# Patient Record
Sex: FEMALE | Race: ASIAN | NOT HISPANIC OR LATINO | URBAN - METROPOLITAN AREA
[De-identification: names, ages, dates, MRNs, and addresses within clinical notes are randomized per-mention and may not be internally consistent; named-entity substitution may affect disease eponyms.]

---

## 2020-07-21 ENCOUNTER — LAB OUTSIDE AN ENCOUNTER (OUTPATIENT)
Dept: URBAN - METROPOLITAN AREA CLINIC 13 | Facility: CLINIC | Age: 35
End: 2020-07-21

## 2020-07-21 LAB
LAB AP ANCILLARY STUDIES(RICH): (no result)
LAB AP GROSS DESCRIPTION (RICH): (no result)

## 2020-07-25 ENCOUNTER — TELEPHONE ENCOUNTER (OUTPATIENT)
Dept: URBAN - METROPOLITAN AREA CLINIC 13 | Facility: CLINIC | Age: 35
End: 2020-07-25

## 2020-07-25 RX ORDER — FAMOTIDINE 20 MG/1
TAKE 1 TABLET AT BEDTIME TABLET, FILM COATED ORAL
Refills: 0 | OUTPATIENT
End: 2019-02-20

## 2020-07-25 RX ORDER — DROSPIRENONE AND ETHINYL ESTRADIOL 0.03MG-3MG
TAKE 1 TABLET DAILY KIT ORAL
Refills: 0 | OUTPATIENT
Start: 2005-10-06 | End: 2010-05-19

## 2020-07-25 RX ORDER — IPRATROPIUM BROMIDE 21 UG/1
1 SPRAY TWICE DAILY SPRAY, METERED NASAL
Refills: 0 | OUTPATIENT
End: 2019-02-20

## 2020-07-25 RX ORDER — TRAMADOL HYDROCHLORIDE 50 MG/1
TAKE 1 TABLET BY MOUTH EVERY 6 HOURS AS NEEDED FOR PAIN TABLET ORAL
Qty: 60 | Refills: 0 | OUTPATIENT
End: 2019-02-20

## 2020-07-25 RX ORDER — MONTELUKAST SODIUM 10 MG/1
TAKE 1 TABLET DAILY TABLET, FILM COATED ORAL
Refills: 0 | OUTPATIENT
End: 2019-02-20

## 2020-07-25 RX ORDER — MELOXICAM 15 MG/1
TAKE 1 TABLET DAILY AS NEEDED TABLET ORAL
Refills: 0 | OUTPATIENT
End: 2017-01-12

## 2020-07-25 RX ORDER — TRAMADOL HYDROCHLORIDE 50 MG/1
TAKE 1 TABLET BY MOUTH EVERY 6 HOURS AS NEEDED FOR PAIN TABLET ORAL
Qty: 50 | Refills: 1 | OUTPATIENT
End: 2017-01-12

## 2020-07-25 RX ORDER — FLUCONAZOLE 100 MG/1
TAKE AS DIRECTED TABLET ORAL
Qty: 12 | Refills: 0 | OUTPATIENT
End: 2019-02-20

## 2020-07-25 RX ORDER — SUCRALFATE 1 G/10ML
TAKE 10 ML BY MOUTH FOUR TIMES DAILY BEFORE EACH MEAL AND AT BEDTIME SUSPENSION ORAL
Qty: 1200 | Refills: 3 | OUTPATIENT
Start: 2016-02-16 | End: 2019-02-20

## 2020-07-25 RX ORDER — METRONIDAZOLE 375 MG/1
TAKE 1 CAPSULE 4 TIMES DAILY CAPSULE ORAL
Refills: 0 | OUTPATIENT
Start: 2006-05-19 | End: 2007-01-04

## 2020-07-25 RX ORDER — NITROFURANTOIN MONOHYDRATE/MACROCRYSTALLINE 25; 75 MG/1; MG/1
TAKE 1 CAPSULE TWICE DAILY UNTIL GONE CAPSULE ORAL
Refills: 0 | OUTPATIENT
End: 2019-02-20

## 2020-07-26 ENCOUNTER — TELEPHONE ENCOUNTER (OUTPATIENT)
Dept: URBAN - METROPOLITAN AREA CLINIC 13 | Facility: CLINIC | Age: 35
End: 2020-07-26

## 2020-07-26 RX ORDER — CYCLOBENZAPRINE HYDROCHLORIDE 5 MG/1
TABLET, FILM COATED ORAL
Qty: 30 | Refills: 0 | Status: ACTIVE | COMMUNITY
Start: 2018-04-25

## 2020-07-26 RX ORDER — MAGNESIUM OXIDE 400 MG
TAKE 1 CAPSULE DAILY CAPSULE ORAL
Refills: 0 | Status: ACTIVE | COMMUNITY

## 2020-07-26 RX ORDER — EPINEPHRINE 0.3 MG/.3ML
INJECT INTRAMUSCULARLY AS DIRECTED AT FIRST SIGN OF ANAPHYLAXIS INJECTION INTRAMUSCULAR
Qty: 2 | Refills: 0 | Status: ACTIVE | COMMUNITY
Start: 2018-10-17

## 2020-07-26 RX ORDER — ALPRAZOLAM 0.5 MG
TAKE 1 TABLET 3 TIMES DAILY AS NEEDED TABLET ORAL
Refills: 0 | Status: ACTIVE | COMMUNITY

## 2020-07-26 RX ORDER — LANADELUMAB-FLYO 300 MG/2ML
INJECTION, SOLUTION SUBCUTANEOUS
Refills: 0 | Status: ACTIVE | COMMUNITY

## 2020-07-26 RX ORDER — AZITHROMYCIN DIHYDRATE 250 MG/1
TABLET, FILM COATED ORAL
Qty: 6 | Refills: 0 | Status: ACTIVE | COMMUNITY
Start: 2018-08-14

## 2020-07-26 RX ORDER — HUMAN C1-ESTERASE INHIBITOR 500 [IU]/5ML
INJECTION, POWDER, LYOPHILIZED, FOR SOLUTION INTRAVENOUS
Qty: 14 | Refills: 0 | Status: ACTIVE | COMMUNITY
Start: 2018-02-01

## 2020-07-26 RX ORDER — OLOPATADINE HYDROCHLORIDE 7 MG/ML
SOLUTION OPHTHALMIC
Qty: 2 | Refills: 0 | Status: ACTIVE | COMMUNITY
Start: 2018-07-09

## 2020-07-26 RX ORDER — ZOLPIDEM TARTRATE 10 MG/1
TAKE 1 TABLET AT BEDTIME AS NEEDED FOR SLEEP TABLET, FILM COATED ORAL
Qty: 15 | Refills: 0 | Status: ACTIVE | COMMUNITY

## 2020-07-26 RX ORDER — DOXYCYCLINE 100 MG/1
CAPSULE ORAL
Qty: 30 | Refills: 0 | Status: ACTIVE | COMMUNITY
Start: 2018-07-31

## 2020-07-26 RX ORDER — FLUTICASONE PROPIONATE 50 UG/1
USE 1 SPRAY IN EACH NOSTRIL TWICE DAILY SPRAY, METERED NASAL
Refills: 0 | Status: ACTIVE | COMMUNITY

## 2020-07-26 RX ORDER — MELOXICAM 15 MG/1
TAKE 1 TABLET DAILY AS NEEDED TABLET ORAL
Refills: 0 | Status: ACTIVE | COMMUNITY

## 2020-07-26 RX ORDER — ICATIBANT ACETATE 30 MG/3ML
INJECT 1 GM  PRN INJECTION, SOLUTION SUBCUTANEOUS
Refills: 0 | Status: ACTIVE | COMMUNITY

## 2020-07-26 RX ORDER — MONTELUKAST SODIUM 10 MG/1
TAKE 1 TABLET DAILY AS DIRECTED TABLET, FILM COATED ORAL
Refills: 0 | Status: ACTIVE | COMMUNITY

## 2020-07-26 RX ORDER — FAMOTIDINE 20 MG/1
TAKE ONE TABLET BY MOUTH AT BEDTIME TABLET, FILM COATED ORAL
Qty: 90 | Refills: 3 | Status: ACTIVE | COMMUNITY
Start: 2020-04-29

## 2020-07-26 RX ORDER — OMEPRAZOLE 40 MG/1
1 CAPSULE BY MOUTH 1 HOUR BEFORE BREAKFAST DAILY CAPSULE, DELAYED RELEASE ORAL
Qty: 90 | Refills: 3 | Status: ACTIVE | COMMUNITY
Start: 2020-04-30

## 2020-07-26 RX ORDER — ESCITALOPRAM 10 MG/1
TABLET, FILM COATED ORAL
Qty: 90 | Refills: 0 | Status: ACTIVE | COMMUNITY
Start: 2018-04-25

## 2020-07-26 RX ORDER — ALBUTEROL SULFATE 90 UG/1
POWDER, METERED RESPIRATORY (INHALATION)
Qty: 1 | Refills: 0 | Status: ACTIVE | COMMUNITY
Start: 2018-07-09

## 2020-07-26 RX ORDER — HYOSCYAMINE SULFATE 0.12 MG/1
PLACE 1 TABLET EVERY 6 HOURS PRN ABD PAIN TABLET, ORALLY DISINTEGRATING ORAL
Qty: 60 | Refills: 1 | Status: ACTIVE | COMMUNITY
Start: 2019-02-20

## 2020-07-26 RX ORDER — PREDNISONE 10 MG/1
TABLET ORAL
Qty: 35 | Refills: 0 | Status: ACTIVE | COMMUNITY
Start: 2018-07-12

## 2020-08-27 ENCOUNTER — WEB ENCOUNTER (OUTPATIENT)
Dept: URBAN - METROPOLITAN AREA CLINIC 113 | Facility: CLINIC | Age: 35
End: 2020-08-27

## 2020-08-27 ENCOUNTER — OFFICE VISIT (OUTPATIENT)
Dept: URBAN - METROPOLITAN AREA CLINIC 113 | Facility: CLINIC | Age: 35
End: 2020-08-27
Payer: COMMERCIAL

## 2020-08-27 VITALS
TEMPERATURE: 97.5 F | RESPIRATION RATE: 20 BRPM | BODY MASS INDEX: 27.97 KG/M2 | HEIGHT: 62 IN | WEIGHT: 152 LBS | HEART RATE: 75 BPM | SYSTOLIC BLOOD PRESSURE: 116 MMHG | DIASTOLIC BLOOD PRESSURE: 78 MMHG

## 2020-08-27 DIAGNOSIS — K20.8 LOS ANGELES GRADE C ESOPHAGITIS: ICD-10-CM

## 2020-08-27 DIAGNOSIS — K21.9 GASTROESOPHAGEAL REFLUX DISEASE, ESOPHAGITIS PRESENCE NOT SPECIFIED: ICD-10-CM

## 2020-08-27 DIAGNOSIS — D13.4 HEPATIC ADENOMA: ICD-10-CM

## 2020-08-27 DIAGNOSIS — K58.2 IRRITABLE BOWEL SYNDROME WITH BOTH CONSTIPATION AND DIARRHEA: ICD-10-CM

## 2020-08-27 PROCEDURE — G8427 DOCREV CUR MEDS BY ELIG CLIN: HCPCS | Performed by: INTERNAL MEDICINE

## 2020-08-27 PROCEDURE — 99213 OFFICE O/P EST LOW 20 MIN: CPT | Performed by: INTERNAL MEDICINE

## 2020-08-27 PROCEDURE — 1036F TOBACCO NON-USER: CPT | Performed by: INTERNAL MEDICINE

## 2020-08-27 PROCEDURE — G9903 PT SCRN TBCO ID AS NON USER: HCPCS | Performed by: INTERNAL MEDICINE

## 2020-08-27 RX ORDER — EPINEPHRINE 0.3 MG/.3ML
INJECT INTRAMUSCULARLY AS DIRECTED AT FIRST SIGN OF ANAPHYLAXIS INJECTION INTRAMUSCULAR
Qty: 2 | Refills: 0 | Status: ACTIVE | COMMUNITY
Start: 2018-10-17

## 2020-08-27 RX ORDER — MONTELUKAST SODIUM 10 MG/1
TAKE 1 TABLET DAILY AS DIRECTED TABLET, FILM COATED ORAL
Refills: 0 | Status: ACTIVE | COMMUNITY

## 2020-08-27 RX ORDER — FAMOTIDINE 20 MG/1
TAKE ONE TABLET BY MOUTH AT BEDTIME TABLET, FILM COATED ORAL
Qty: 90 | Refills: 3 | Status: ACTIVE | COMMUNITY
Start: 2020-04-29

## 2020-08-27 RX ORDER — HUMAN C1-ESTERASE INHIBITOR 500 [IU]/5ML
AS DIRECTED INJECTION, POWDER, LYOPHILIZED, FOR SOLUTION INTRAVENOUS
Refills: 0 | Status: ACTIVE | COMMUNITY
Start: 2018-02-01

## 2020-08-27 RX ORDER — ALBUTEROL SULFATE 90 UG/1
POWDER, METERED RESPIRATORY (INHALATION)
Qty: 1 | Refills: 0 | Status: ACTIVE | COMMUNITY
Start: 2018-07-09

## 2020-08-27 RX ORDER — FLUTICASONE PROPIONATE 50 UG/1
USE 1 SPRAY IN EACH NOSTRIL TWICE DAILY SPRAY, METERED NASAL
Refills: 0 | Status: ACTIVE | COMMUNITY

## 2020-08-27 RX ORDER — OMEPRAZOLE 40 MG/1
1 CAPSULE 30 MINUTES BEFORE MORNING MEAL CAPSULE, DELAYED RELEASE ORAL TWICE A DAY
Refills: 3 | Status: ACTIVE | COMMUNITY
Start: 2020-04-30

## 2020-08-27 RX ORDER — ICATIBANT ACETATE 30 MG/3ML
INJECT 1 GM  PRN INJECTION, SOLUTION SUBCUTANEOUS
Refills: 0 | Status: ACTIVE | COMMUNITY

## 2020-08-27 RX ORDER — ESCITALOPRAM 10 MG/1
TABLET, FILM COATED ORAL
Qty: 90 | Refills: 0 | Status: ACTIVE | COMMUNITY
Start: 2018-04-25

## 2020-08-27 RX ORDER — OMEPRAZOLE 40 MG/1
1 CAPSULE CAPSULE, DELAYED RELEASE ORAL TWICE A DAY
Qty: 60 | Refills: 5 | OUTPATIENT
Start: 2020-08-27

## 2020-08-27 RX ORDER — OLOPATADINE HYDROCHLORIDE 7 MG/ML
1 DROP INTO AFFECTED EYE SOLUTION OPHTHALMIC ONCE A DAY
Refills: 0 | Status: ACTIVE | COMMUNITY
Start: 2018-07-09

## 2020-08-27 RX ORDER — ZOLPIDEM TARTRATE 10 MG/1
TAKE 1 TABLET AT BEDTIME AS NEEDED FOR SLEEP TABLET, FILM COATED ORAL
Qty: 15 | Refills: 0 | Status: ACTIVE | COMMUNITY

## 2020-08-27 RX ORDER — ALPRAZOLAM 0.5 MG
TAKE 1 TABLET 3 TIMES DAILY AS NEEDED TABLET ORAL
Refills: 0 | Status: ACTIVE | COMMUNITY

## 2020-08-27 NOTE — HPI-TODAY'S VISIT:
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 She is under the care of Dr. Milligan for hereditary angioedema. She was seen in hospital consultation 7/20/2020 for epigastric pain, nausea and vomiting, and diarrhea.  It was considered that nausea, vomiting and diarrhea were likely secondary to gastrointestinal manifestations of hereditary angioedema.  However, symptoms continued despite taking C1 esterase inhibitor.  CT was negative for gastrointestinal angioedema of the bowel.  Stool for Campylobacter and Giardia were negative.  Other stool studies were pending at the time of the consultation.  There was concern that epigastric pain may be related to gastritis versus peptic ulcer disease.  She had been taking ibuprofen 800 mg every other day.   EGD 7/21/2020: Erosive esophagitis, LA grade C, otherwise normal to D3.  Distal esophageal biopsies demonstrated active esophagitis.  No fungal organisms or viral cytopathic effect was seen.  No additional hospital records are available. She has been compliant with pantoprazole 40 mg twice a day since discharge. She is no longer on NSAIDs. She exhausted her supply after 30 days and is now taking omeprazole 40 mg twice a day.  She reports resolution of symptoms.  Bowel habits have returned to baseline reporting occasional constipation and occasional diarrhea.

## 2020-08-29 PROBLEM — 16761005: Status: ACTIVE | Noted: 2020-08-27

## 2020-08-29 PROBLEM — 235595009: Status: ACTIVE | Noted: 2020-08-29

## 2020-08-29 PROBLEM — 424263008: Status: ACTIVE | Noted: 2020-08-27

## 2020-08-29 PROBLEM — 10743008: Status: ACTIVE | Noted: 2020-08-29

## 2020-10-15 ENCOUNTER — OFFICE VISIT (OUTPATIENT)
Dept: URBAN - METROPOLITAN AREA CLINIC 113 | Facility: CLINIC | Age: 35
End: 2020-10-15

## 2021-02-01 ENCOUNTER — TELEPHONE ENCOUNTER (OUTPATIENT)
Dept: URBAN - METROPOLITAN AREA CLINIC 113 | Facility: CLINIC | Age: 36
End: 2021-02-01

## 2021-02-01 RX ORDER — OMEPRAZOLE 40 MG/1
1 CAPSULE CAPSULE, DELAYED RELEASE ORAL TWICE A DAY
Qty: 60 | Refills: 5
Start: 2020-08-27

## 2021-10-21 ENCOUNTER — OFFICE VISIT (OUTPATIENT)
Dept: URBAN - METROPOLITAN AREA CLINIC 113 | Facility: CLINIC | Age: 36
End: 2021-10-21
Payer: COMMERCIAL

## 2021-10-21 ENCOUNTER — LAB OUTSIDE AN ENCOUNTER (OUTPATIENT)
Dept: URBAN - METROPOLITAN AREA CLINIC 113 | Facility: CLINIC | Age: 36
End: 2021-10-21

## 2021-10-21 VITALS
BODY MASS INDEX: 31.1 KG/M2 | WEIGHT: 169 LBS | HEART RATE: 84 BPM | SYSTOLIC BLOOD PRESSURE: 135 MMHG | DIASTOLIC BLOOD PRESSURE: 76 MMHG | HEIGHT: 62 IN | TEMPERATURE: 98 F | RESPIRATION RATE: 18 BRPM

## 2021-10-21 DIAGNOSIS — R13.19 ESOPHAGEAL DYSPHAGIA: ICD-10-CM

## 2021-10-21 DIAGNOSIS — D13.4 ADENOMA OF LIVER: ICD-10-CM

## 2021-10-21 DIAGNOSIS — K62.5 BLOOD PER RECTUM: ICD-10-CM

## 2021-10-21 DIAGNOSIS — K21.00 GASTROESOPHAGEAL REFLUX DISEASE WITH ESOPHAGITIS WITHOUT HEMORRHAGE: ICD-10-CM

## 2021-10-21 PROCEDURE — 99214 OFFICE O/P EST MOD 30 MIN: CPT | Performed by: NURSE PRACTITIONER

## 2021-10-21 RX ORDER — OMEPRAZOLE 40 MG/1
1 CAPSULE 30 MINUTES BEFORE MORNING MEAL CAPSULE, DELAYED RELEASE ORAL TWICE A DAY
Refills: 3 | Status: ACTIVE | COMMUNITY
Start: 2020-04-30

## 2021-10-21 RX ORDER — HUMAN C1-ESTERASE INHIBITOR 500 [IU]/5ML
AS DIRECTED INJECTION, POWDER, LYOPHILIZED, FOR SOLUTION INTRAVENOUS
Refills: 0 | Status: ACTIVE | COMMUNITY
Start: 2018-02-01

## 2021-10-21 RX ORDER — OLOPATADINE HYDROCHLORIDE 7 MG/ML
1 DROP INTO AFFECTED EYE SOLUTION OPHTHALMIC ONCE A DAY
Refills: 0 | Status: ACTIVE | COMMUNITY
Start: 2018-07-09

## 2021-10-21 RX ORDER — ZOLPIDEM TARTRATE 10 MG/1
TAKE 1 TABLET AT BEDTIME AS NEEDED FOR SLEEP TABLET, FILM COATED ORAL
Qty: 15 | Refills: 0 | Status: ACTIVE | COMMUNITY

## 2021-10-21 RX ORDER — OMEPRAZOLE 40 MG/1
1 CAPSULE CAPSULE, DELAYED RELEASE ORAL TWICE DAILY
Qty: 180 | Refills: 3 | OUTPATIENT
Start: 2020-04-30

## 2021-10-21 RX ORDER — ALPRAZOLAM 0.5 MG
TAKE 1 TABLET 3 TIMES DAILY AS NEEDED TABLET ORAL
Refills: 0 | Status: ACTIVE | COMMUNITY

## 2021-10-21 RX ORDER — SUCRALFATE 1 G
1 TABLET ON AN EMPTY STOMACH TABLET ORAL TWICE A DAY
Status: ACTIVE | COMMUNITY

## 2021-10-21 RX ORDER — ALBUTEROL SULFATE 90 UG/1
POWDER, METERED RESPIRATORY (INHALATION)
Qty: 1 | Refills: 0 | Status: ACTIVE | COMMUNITY
Start: 2018-07-09

## 2021-10-21 RX ORDER — FAMOTIDINE 20 MG/1
TAKE ONE TABLET BY MOUTH AT BEDTIME TABLET, FILM COATED ORAL
Qty: 90 | Refills: 3 | Status: ACTIVE | COMMUNITY
Start: 2020-04-29

## 2021-10-21 RX ORDER — ICATIBANT ACETATE 30 MG/3ML
INJECT 1 GM  PRN INJECTION, SOLUTION SUBCUTANEOUS
Refills: 0 | Status: ACTIVE | COMMUNITY

## 2021-10-21 RX ORDER — FLUTICASONE PROPIONATE 50 UG/1
USE 1 SPRAY IN EACH NOSTRIL TWICE DAILY SPRAY, METERED NASAL
Refills: 0 | Status: ACTIVE | COMMUNITY

## 2021-10-21 RX ORDER — FAMOTIDINE 20 MG/1
TAKE ONE TABLET BY MOUTH AT BEDTIME TABLET, FILM COATED ORAL
OUTPATIENT
Start: 2020-04-29

## 2021-10-21 RX ORDER — EPINEPHRINE 0.3 MG/.3ML
INJECT INTRAMUSCULARLY AS DIRECTED AT FIRST SIGN OF ANAPHYLAXIS INJECTION INTRAMUSCULAR
Qty: 2 | Refills: 0 | Status: ACTIVE | COMMUNITY
Start: 2018-10-17

## 2021-10-21 RX ORDER — ESCITALOPRAM 10 MG/1
TABLET, FILM COATED ORAL
Qty: 90 | Refills: 0 | Status: ACTIVE | COMMUNITY
Start: 2018-04-25

## 2021-10-21 RX ORDER — OMEPRAZOLE 40 MG/1
1 CAPSULE CAPSULE, DELAYED RELEASE ORAL TWICE A DAY
Qty: 60 | Refills: 5 | Status: ACTIVE | COMMUNITY
Start: 2020-08-27

## 2021-10-21 RX ORDER — SIMETHICONE 80 MG/1
1 TABLET AFTER MEALS AND AT BEDTIME AS NEEDED TABLET, CHEWABLE ORAL
Status: ACTIVE | COMMUNITY

## 2021-10-21 RX ORDER — MONTELUKAST SODIUM 10 MG/1
TAKE 1 TABLET DAILY AS DIRECTED TABLET, FILM COATED ORAL
Refills: 0 | Status: ACTIVE | COMMUNITY

## 2021-10-21 RX ORDER — SUCRALFATE 1 G
1 TABLET ON AN EMPTY STOMACH TABLET ORAL TWICE A DAY
OUTPATIENT

## 2021-10-21 NOTE — HPI-TODAY'S VISIT:
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  She was last seen in the office in August 2020 in follow-up after a hospitalization for epigastric pain, nausea and vomiting and diarrhea.  It was considered that nausea, vomiting and diarrhea are likely secondary to gastrointestinal manifestations of hereditary angioedema.  CT was negative for gastrointestinal angioedema of the bowel.  Stool for Campylobacter and Giardia were negative.  There was concern for possible gastritis versus peptic ulcer disease given her use of NSAIDs daily, so she was planned for an EGD.  An EGD in July 2020 showed erosive esophagitis, LA grade C esophagitis, otherwise normal EGD to D3.  Distal esophageal biopsies demonstrated active esophagitis.  She was to continue twice daily PPI, and was asymptomatic on that regimen. MRI 10/8/2020: Stable involuting adenomas with previous larger dominant lesion in segment VIII is no longer visualized. Otehr stable 0.9 cm adenoma in hepatic segment VIII.  There is dysphagia. She describes a sensation of something stuck in her throat all of the time, requiring she clear her throat often. She is taking allergy medications, flonase, etc. She has trouble with solid foods sticking at the sternal notch. Water can be problematic at times. She complains of heartburn. She tells me that she started a new medication for hereditary angioedema in July with significant burning. She took this for 3 months, but could not tolerate it due to complaints of burning from her umbilicus to the throat.  She has had improvement in her burning chest pain since stopping Orladyo. She is back on Cinryze for angioedema. She is taking omeprazole 40 mg once daily. She has been taking famotidine at nighttime. The ED added carafate as well. There is nausea frequently. No vomiting. She has bowel movements daily. She has noted some red blood per rectum, mostly on the tissue and small in volume. She is not on a fiber supplement.

## 2021-10-28 ENCOUNTER — OFFICE VISIT (OUTPATIENT)
Dept: URBAN - METROPOLITAN AREA SURGERY CENTER 25 | Facility: SURGERY CENTER | Age: 36
End: 2021-10-28
Payer: COMMERCIAL

## 2021-10-28 DIAGNOSIS — K20.90 ESOPHAGITIS: ICD-10-CM

## 2021-10-28 DIAGNOSIS — K29.50 CHRONIC GASTRITIS: ICD-10-CM

## 2021-10-28 PROCEDURE — 43239 EGD BIOPSY SINGLE/MULTIPLE: CPT | Performed by: INTERNAL MEDICINE

## 2021-10-28 PROCEDURE — G8907 PT DOC NO EVENTS ON DISCHARG: HCPCS | Performed by: INTERNAL MEDICINE

## 2021-10-28 RX ORDER — ZOLPIDEM TARTRATE 10 MG/1
TAKE 1 TABLET AT BEDTIME AS NEEDED FOR SLEEP TABLET, FILM COATED ORAL
Qty: 15 | Refills: 0 | Status: ACTIVE | COMMUNITY

## 2021-10-28 RX ORDER — SUCRALFATE 1 G
1 TABLET ON AN EMPTY STOMACH TABLET ORAL TWICE A DAY
Status: ACTIVE | COMMUNITY

## 2021-10-28 RX ORDER — ALPRAZOLAM 0.5 MG
TAKE 1 TABLET 3 TIMES DAILY AS NEEDED TABLET ORAL
Refills: 0 | Status: ACTIVE | COMMUNITY

## 2021-10-28 RX ORDER — ICATIBANT ACETATE 30 MG/3ML
INJECT 1 GM  PRN INJECTION, SOLUTION SUBCUTANEOUS
Refills: 0 | Status: ACTIVE | COMMUNITY

## 2021-10-28 RX ORDER — SIMETHICONE 80 MG/1
1 TABLET AFTER MEALS AND AT BEDTIME AS NEEDED TABLET, CHEWABLE ORAL
Status: ACTIVE | COMMUNITY

## 2021-10-28 RX ORDER — OMEPRAZOLE 40 MG/1
1 CAPSULE CAPSULE, DELAYED RELEASE ORAL TWICE DAILY
Qty: 180 | Refills: 3 | Status: ACTIVE | COMMUNITY
Start: 2020-04-30

## 2021-10-28 RX ORDER — MONTELUKAST SODIUM 10 MG/1
TAKE 1 TABLET DAILY AS DIRECTED TABLET, FILM COATED ORAL
Refills: 0 | Status: ACTIVE | COMMUNITY

## 2021-10-28 RX ORDER — EPINEPHRINE 0.3 MG/.3ML
INJECT INTRAMUSCULARLY AS DIRECTED AT FIRST SIGN OF ANAPHYLAXIS INJECTION INTRAMUSCULAR
Qty: 2 | Refills: 0 | Status: ACTIVE | COMMUNITY
Start: 2018-10-17

## 2021-10-28 RX ORDER — FAMOTIDINE 20 MG/1
TAKE ONE TABLET BY MOUTH AT BEDTIME TABLET, FILM COATED ORAL
Status: ACTIVE | COMMUNITY
Start: 2020-04-29

## 2021-10-28 RX ORDER — FLUTICASONE PROPIONATE 50 UG/1
USE 1 SPRAY IN EACH NOSTRIL TWICE DAILY SPRAY, METERED NASAL
Refills: 0 | Status: ACTIVE | COMMUNITY

## 2021-10-28 RX ORDER — ESCITALOPRAM 10 MG/1
TABLET, FILM COATED ORAL
Qty: 90 | Refills: 0 | Status: ACTIVE | COMMUNITY
Start: 2018-04-25

## 2021-10-28 RX ORDER — OMEPRAZOLE 40 MG/1
1 CAPSULE CAPSULE, DELAYED RELEASE ORAL TWICE A DAY
Qty: 60 | Refills: 5 | Status: ACTIVE | COMMUNITY
Start: 2020-08-27

## 2021-10-28 RX ORDER — HUMAN C1-ESTERASE INHIBITOR 500 [IU]/5ML
AS DIRECTED INJECTION, POWDER, LYOPHILIZED, FOR SOLUTION INTRAVENOUS
Refills: 0 | Status: ACTIVE | COMMUNITY
Start: 2018-02-01

## 2021-10-28 RX ORDER — OLOPATADINE HYDROCHLORIDE 7 MG/ML
1 DROP INTO AFFECTED EYE SOLUTION OPHTHALMIC ONCE A DAY
Refills: 0 | Status: ACTIVE | COMMUNITY
Start: 2018-07-09

## 2021-10-28 RX ORDER — ALBUTEROL SULFATE 90 UG/1
POWDER, METERED RESPIRATORY (INHALATION)
Qty: 1 | Refills: 0 | Status: ACTIVE | COMMUNITY
Start: 2018-07-09

## 2021-12-09 ENCOUNTER — OFFICE VISIT (OUTPATIENT)
Dept: URBAN - METROPOLITAN AREA CLINIC 113 | Facility: CLINIC | Age: 36
End: 2021-12-09
Payer: COMMERCIAL

## 2021-12-09 VITALS
HEART RATE: 83 BPM | BODY MASS INDEX: 32.35 KG/M2 | WEIGHT: 175.8 LBS | TEMPERATURE: 97.3 F | RESPIRATION RATE: 18 BRPM | HEIGHT: 62 IN | SYSTOLIC BLOOD PRESSURE: 130 MMHG | DIASTOLIC BLOOD PRESSURE: 78 MMHG

## 2021-12-09 DIAGNOSIS — R13.19 ESOPHAGEAL DYSPHAGIA: ICD-10-CM

## 2021-12-09 DIAGNOSIS — K21.00 GASTROESOPHAGEAL REFLUX DISEASE WITH ESOPHAGITIS WITHOUT HEMORRHAGE: ICD-10-CM

## 2021-12-09 DIAGNOSIS — K62.5 BLOOD PER RECTUM: ICD-10-CM

## 2021-12-09 DIAGNOSIS — D13.4 ADENOMA OF LIVER: ICD-10-CM

## 2021-12-09 PROBLEM — 12063002: Status: ACTIVE | Noted: 2021-10-21

## 2021-12-09 PROCEDURE — 99213 OFFICE O/P EST LOW 20 MIN: CPT | Performed by: NURSE PRACTITIONER

## 2021-12-09 RX ORDER — EPINEPHRINE 0.3 MG/.3ML
INJECT INTRAMUSCULARLY AS DIRECTED AT FIRST SIGN OF ANAPHYLAXIS INJECTION INTRAMUSCULAR
Qty: 2 | Refills: 0 | Status: ACTIVE | COMMUNITY
Start: 2018-10-17

## 2021-12-09 RX ORDER — OMEPRAZOLE 40 MG/1
1 CAPSULE CAPSULE, DELAYED RELEASE ORAL TWICE A DAY
Qty: 60 | Refills: 5 | Status: ACTIVE | COMMUNITY
Start: 2020-08-27

## 2021-12-09 RX ORDER — ICATIBANT ACETATE 30 MG/3ML
INJECT 1 GM  PRN INJECTION, SOLUTION SUBCUTANEOUS
Refills: 0 | Status: ACTIVE | COMMUNITY

## 2021-12-09 RX ORDER — ESCITALOPRAM 10 MG/1
TABLET, FILM COATED ORAL
Qty: 90 | Refills: 0 | Status: ACTIVE | COMMUNITY
Start: 2018-04-25

## 2021-12-09 RX ORDER — HUMAN C1-ESTERASE INHIBITOR 500 [IU]/5ML
AS DIRECTED INJECTION, POWDER, LYOPHILIZED, FOR SOLUTION INTRAVENOUS
Refills: 0 | Status: ACTIVE | COMMUNITY
Start: 2018-02-01

## 2021-12-09 RX ORDER — OLOPATADINE HYDROCHLORIDE 7 MG/ML
1 DROP INTO AFFECTED EYE SOLUTION OPHTHALMIC ONCE A DAY
Refills: 0 | Status: ACTIVE | COMMUNITY
Start: 2018-07-09

## 2021-12-09 RX ORDER — SUCRALFATE 1 G
1 TABLET ON AN EMPTY STOMACH TABLET ORAL TWICE A DAY
OUTPATIENT

## 2021-12-09 RX ORDER — ZOLPIDEM TARTRATE 10 MG/1
TAKE 1 TABLET AT BEDTIME AS NEEDED FOR SLEEP TABLET, FILM COATED ORAL
Qty: 15 | Refills: 0 | Status: ACTIVE | COMMUNITY

## 2021-12-09 RX ORDER — SIMETHICONE 80 MG/1
1 TABLET AFTER MEALS AND AT BEDTIME AS NEEDED TABLET, CHEWABLE ORAL
Status: ACTIVE | COMMUNITY

## 2021-12-09 RX ORDER — OMEPRAZOLE 40 MG/1
1 CAPSULE CAPSULE, DELAYED RELEASE ORAL TWICE DAILY
Qty: 180 | Refills: 3 | Status: ACTIVE | COMMUNITY
Start: 2020-04-30

## 2021-12-09 RX ORDER — FLUTICASONE PROPIONATE 50 UG/1
USE 1 SPRAY IN EACH NOSTRIL TWICE DAILY SPRAY, METERED NASAL
Refills: 0 | Status: ACTIVE | COMMUNITY

## 2021-12-09 RX ORDER — MONTELUKAST SODIUM 10 MG/1
TAKE 1 TABLET DAILY AS DIRECTED TABLET, FILM COATED ORAL
Refills: 0 | Status: ACTIVE | COMMUNITY

## 2021-12-09 RX ORDER — ALBUTEROL SULFATE 90 UG/1
POWDER, METERED RESPIRATORY (INHALATION)
Qty: 1 | Refills: 0 | Status: ACTIVE | COMMUNITY
Start: 2018-07-09

## 2021-12-09 RX ORDER — FAMOTIDINE 20 MG/1
TAKE ONE TABLET BY MOUTH AT BEDTIME TABLET, FILM COATED ORAL
OUTPATIENT

## 2021-12-09 RX ORDER — OMEPRAZOLE 40 MG/1
1 CAPSULE CAPSULE, DELAYED RELEASE ORAL TWICE DAILY
Qty: 180 | Refills: 3 | OUTPATIENT

## 2021-12-09 RX ORDER — FAMOTIDINE 20 MG/1
TAKE ONE TABLET BY MOUTH AT BEDTIME TABLET, FILM COATED ORAL
Status: ACTIVE | COMMUNITY
Start: 2020-04-29

## 2021-12-09 RX ORDER — ALPRAZOLAM 0.5 MG
TAKE 1 TABLET 3 TIMES DAILY AS NEEDED TABLET ORAL
Refills: 0 | Status: ACTIVE | COMMUNITY

## 2021-12-09 RX ORDER — SUCRALFATE 1 G
1 TABLET ON AN EMPTY STOMACH TABLET ORAL TWICE A DAY
Status: ACTIVE | COMMUNITY

## 2021-12-09 NOTE — HPI-TODAY'S VISIT:
This is a 36-year-old female with a history of GERD and esophageal dysphagia characterized as food sticking at the sternal notch with a history of LA grade C esophagitis, presenting for follow-up after an EGD.  Additionally, she has a history of liver adenoma which was stable on an MRI in October 2020.  She was recommended a repeat MRI for surveillance. An EGD was performed 10/28/2021.  Findings included regular Z-line 38 cm from the incisors, esophageal mucosal variant which was biopsied, erythematous mucosa in the gastric body which was biopsied and a normal examined duodenum.  Esophageal variant biopsy showed chronic esophagitis which was negative for Saenz's, EOE, viruses or dysplasia.  Random esophageal biopsies showed squamous mucosa without significant histopathologic changes.  Stomach erythema showed chronic gastritis which was mild, and negative for infection such as H. pylori and intestinal metaplasia and dysplasia.  She was recommended to continue omeprazole 40 mg daily. MRI of the abdomen with and without contrast liver mass protocol on 10/29/2021 showed decreasing size of liver adenoma, now 7 mm from 9 mm.  No new lesions.  12/9/21: She tells me that her symptoms are largely unchanged. She continues with some trouble with swallowing, and heartburn complaints. She is taking omeprazole 40 mg daily and famotidine 40 mg daily. There is frequent nausea but not vomiting. Bowels are moving okay; she states that it alternates between constipation and diarrhea. She is taking 2 fiber capsules daily.

## 2022-02-11 ENCOUNTER — TELEPHONE ENCOUNTER (OUTPATIENT)
Dept: URBAN - METROPOLITAN AREA CLINIC 113 | Facility: CLINIC | Age: 37
End: 2022-02-11

## 2022-02-11 RX ORDER — OMEPRAZOLE 40 MG/1
1 CAPSULE CAPSULE, DELAYED RELEASE ORAL TWICE DAILY
Qty: 180 | Refills: 3

## 2023-02-17 ENCOUNTER — OFFICE VISIT (OUTPATIENT)
Dept: URBAN - METROPOLITAN AREA CLINIC 113 | Facility: CLINIC | Age: 38
End: 2023-02-17
Payer: COMMERCIAL

## 2023-02-17 ENCOUNTER — WEB ENCOUNTER (OUTPATIENT)
Dept: URBAN - METROPOLITAN AREA CLINIC 113 | Facility: CLINIC | Age: 38
End: 2023-02-17

## 2023-02-17 VITALS
HEIGHT: 62 IN | TEMPERATURE: 98.6 F | WEIGHT: 180 LBS | HEART RATE: 76 BPM | SYSTOLIC BLOOD PRESSURE: 151 MMHG | BODY MASS INDEX: 33.13 KG/M2 | DIASTOLIC BLOOD PRESSURE: 88 MMHG | RESPIRATION RATE: 16 BRPM

## 2023-02-17 DIAGNOSIS — K21.00 GASTROESOPHAGEAL REFLUX DISEASE WITH ESOPHAGITIS WITHOUT HEMORRHAGE: ICD-10-CM

## 2023-02-17 DIAGNOSIS — D13.4 ADENOMA OF LIVER: ICD-10-CM

## 2023-02-17 DIAGNOSIS — R13.19 ESOPHAGEAL DYSPHAGIA: ICD-10-CM

## 2023-02-17 PROBLEM — 40890009: Status: ACTIVE | Noted: 2021-10-21

## 2023-02-17 PROBLEM — 266433003: Status: ACTIVE | Noted: 2021-10-21

## 2023-02-17 PROCEDURE — 99213 OFFICE O/P EST LOW 20 MIN: CPT | Performed by: NURSE PRACTITIONER

## 2023-02-17 RX ORDER — GABAPENTIN 600 MG/1
1 TABLET TABLET, FILM COATED ORAL ONCE A DAY
Status: ACTIVE | COMMUNITY

## 2023-02-17 RX ORDER — ALPRAZOLAM 0.5 MG
TAKE 1 TABLET 3 TIMES DAILY AS NEEDED TABLET ORAL
Refills: 0 | Status: ACTIVE | COMMUNITY

## 2023-02-17 RX ORDER — OMEPRAZOLE 40 MG/1
1 CAPSULE 30 MINUTES BEFORE MORNING MEAL CAPSULE, DELAYED RELEASE ORAL ONCE A DAY
Qty: 90 | Refills: 3 | OUTPATIENT

## 2023-02-17 RX ORDER — FLUTICASONE PROPIONATE 50 UG/1
USE 1 SPRAY IN EACH NOSTRIL TWICE DAILY SPRAY, METERED NASAL
Refills: 0 | Status: ACTIVE | COMMUNITY

## 2023-02-17 RX ORDER — OMEPRAZOLE 40 MG/1
1 CAPSULE CAPSULE, DELAYED RELEASE ORAL TWICE A DAY
Qty: 60 | Refills: 5 | Status: ACTIVE | COMMUNITY
Start: 2020-08-27

## 2023-02-17 RX ORDER — ICATIBANT ACETATE 30 MG/3ML
INJECT 1 GM  PRN INJECTION, SOLUTION SUBCUTANEOUS
Refills: 0 | Status: ACTIVE | COMMUNITY

## 2023-02-17 RX ORDER — SUCRALFATE 1 G
1 TABLET ON AN EMPTY STOMACH TABLET ORAL TWICE A DAY
Status: ON HOLD | COMMUNITY

## 2023-02-17 RX ORDER — HUMAN C1-ESTERASE INHIBITOR 500 [IU]/5ML
AS DIRECTED INJECTION, POWDER, LYOPHILIZED, FOR SOLUTION INTRAVENOUS
Refills: 0 | Status: ACTIVE | COMMUNITY
Start: 2018-02-01

## 2023-02-17 RX ORDER — ERENUMAB-AOOE 70 MG/ML
AS DIRECTED INJECTION SUBCUTANEOUS
Status: ACTIVE | COMMUNITY

## 2023-02-17 RX ORDER — SIMETHICONE 80 MG/1
1 TABLET AFTER MEALS AND AT BEDTIME AS NEEDED TABLET, CHEWABLE ORAL
Status: ACTIVE | COMMUNITY

## 2023-02-17 RX ORDER — MONTELUKAST SODIUM 10 MG/1
TAKE 1 TABLET DAILY AS DIRECTED TABLET, FILM COATED ORAL
Refills: 0 | Status: ACTIVE | COMMUNITY

## 2023-02-17 RX ORDER — OLOPATADINE HYDROCHLORIDE 7 MG/ML
1 DROP INTO AFFECTED EYE SOLUTION OPHTHALMIC ONCE A DAY
Refills: 0 | Status: ACTIVE | COMMUNITY
Start: 2018-07-09

## 2023-02-17 RX ORDER — ESCITALOPRAM 10 MG/1
TABLET, FILM COATED ORAL
Qty: 90 | Refills: 0 | Status: ACTIVE | COMMUNITY
Start: 2018-04-25

## 2023-02-17 RX ORDER — OMEPRAZOLE 40 MG/1
1 CAPSULE CAPSULE, DELAYED RELEASE ORAL TWICE DAILY
Qty: 180 | Refills: 3 | Status: ON HOLD | COMMUNITY

## 2023-02-17 RX ORDER — ALBUTEROL SULFATE 90 UG/1
POWDER, METERED RESPIRATORY (INHALATION)
Qty: 1 | Refills: 0 | Status: ACTIVE | COMMUNITY
Start: 2018-07-09

## 2023-02-17 RX ORDER — FAMOTIDINE 20 MG/1
TAKE ONE TABLET BY MOUTH AT BEDTIME TABLET, FILM COATED ORAL
Status: ACTIVE | COMMUNITY

## 2023-02-17 RX ORDER — ZOLPIDEM TARTRATE 10 MG/1
TAKE 1 TABLET AT BEDTIME AS NEEDED FOR SLEEP TABLET, FILM COATED ORAL
Qty: 15 | Refills: 0 | Status: ACTIVE | COMMUNITY

## 2023-02-17 RX ORDER — EPINEPHRINE 0.3 MG/.3ML
INJECT INTRAMUSCULARLY AS DIRECTED AT FIRST SIGN OF ANAPHYLAXIS INJECTION INTRAMUSCULAR
Qty: 2 | Refills: 0 | Status: ACTIVE | COMMUNITY
Start: 2018-10-17

## 2023-02-17 NOTE — HPI-TODAY'S VISIT:
This is a 37-year-old with a history of GERD, esophageal dysphagia, and adenomatous colon polyps, presenting for medication refill. She was seen in the office in December 2021 following an EGD performed in 2020 notable for LA grade C esophagitis.  She was asked to continue omeprazole 40 mg twice daily and Pepcid 20 mg daily at bedtime.  She was recommended Benefiber twice daily for blood per rectum.  Regarding history of liver adenoma, MRI in October 2021 showed decreasing size of the liver adenoma in comparison to MRI from October 2020. CT abdomen and pelvis with contrast 12/14/2022:Previously noted involuting hepatic adenomas were not well visualized on exam.  There was mild wall thickening involving the sigmoid colon without adjacent inflammatory stranding.  Portions of the bowel were decompressed limiting evaluation.  She has been on omeprazole 40 mg daily with famotidine 20 mg at bedtime. She has good control of reflux on this regimen. She is having some trouble with swallowing but not bad enough to pursue any intervention. There is no nausea or vomiting. She can have some abdominal pain associated bloating or when her colitis acts up. She has bowel movements daily, approximately 3 times daily. No nocturnal defecation. No blood per rectum. She can alternate with constipation. She is not currently on a fiber supplement.

## 2023-05-19 ENCOUNTER — OFFICE VISIT (OUTPATIENT)
Dept: URBAN - METROPOLITAN AREA CLINIC 113 | Facility: CLINIC | Age: 38
End: 2023-05-19

## 2023-07-11 ENCOUNTER — OFFICE VISIT (OUTPATIENT)
Dept: URBAN - METROPOLITAN AREA CLINIC 113 | Facility: CLINIC | Age: 38
End: 2023-07-11
Payer: COMMERCIAL

## 2023-07-11 ENCOUNTER — LAB OUTSIDE AN ENCOUNTER (OUTPATIENT)
Dept: URBAN - METROPOLITAN AREA CLINIC 113 | Facility: CLINIC | Age: 38
End: 2023-07-11

## 2023-07-11 VITALS
RESPIRATION RATE: 18 BRPM | WEIGHT: 180.4 LBS | HEIGHT: 62 IN | TEMPERATURE: 97.5 F | DIASTOLIC BLOOD PRESSURE: 80 MMHG | BODY MASS INDEX: 33.2 KG/M2 | HEART RATE: 85 BPM | SYSTOLIC BLOOD PRESSURE: 108 MMHG

## 2023-07-11 DIAGNOSIS — R13.19 ESOPHAGEAL DYSPHAGIA: ICD-10-CM

## 2023-07-11 DIAGNOSIS — K21.00 GASTROESOPHAGEAL REFLUX DISEASE WITH ESOPHAGITIS WITHOUT HEMORRHAGE: ICD-10-CM

## 2023-07-11 DIAGNOSIS — D13.4 ADENOMA OF LIVER: ICD-10-CM

## 2023-07-11 PROCEDURE — 99214 OFFICE O/P EST MOD 30 MIN: CPT | Performed by: INTERNAL MEDICINE

## 2023-07-11 RX ORDER — OMEPRAZOLE 40 MG/1
1 CAPSULE CAPSULE, DELAYED RELEASE ORAL TWICE DAILY
Qty: 180 | Refills: 3 | Status: ON HOLD | COMMUNITY

## 2023-07-11 RX ORDER — OMEPRAZOLE 40 MG/1
1 CAPSULE CAPSULE, DELAYED RELEASE ORAL TWICE A DAY
Qty: 60 | Refills: 5 | Status: ON HOLD | COMMUNITY
Start: 2020-08-27

## 2023-07-11 RX ORDER — SUCRALFATE 1 G
1 TABLET ON AN EMPTY STOMACH TABLET ORAL TWICE A DAY
Status: ON HOLD | COMMUNITY

## 2023-07-11 RX ORDER — OMEPRAZOLE 40 MG/1
1 CAPSULE 30 MINUTES BEFORE MORNING MEAL CAPSULE, DELAYED RELEASE ORAL ONCE A DAY
Qty: 90 | Refills: 3 | Status: ACTIVE | COMMUNITY

## 2023-07-11 RX ORDER — ESCITALOPRAM 10 MG/1
TABLET, FILM COATED ORAL
Qty: 90 | Refills: 0 | Status: ACTIVE | COMMUNITY
Start: 2018-04-25

## 2023-07-11 RX ORDER — OLOPATADINE HYDROCHLORIDE 7 MG/ML
1 DROP INTO AFFECTED EYE SOLUTION OPHTHALMIC ONCE A DAY
Refills: 0 | Status: ACTIVE | COMMUNITY
Start: 2018-07-09

## 2023-07-11 RX ORDER — ALPRAZOLAM 0.5 MG
TAKE 1 TABLET 3 TIMES DAILY AS NEEDED TABLET ORAL
Refills: 0 | Status: ACTIVE | COMMUNITY

## 2023-07-11 RX ORDER — FLUTICASONE PROPIONATE 50 UG/1
USE 1 SPRAY IN EACH NOSTRIL TWICE DAILY SPRAY, METERED NASAL
Refills: 0 | Status: ACTIVE | COMMUNITY

## 2023-07-11 RX ORDER — SIMETHICONE 80 MG/1
1 TABLET AFTER MEALS AND AT BEDTIME AS NEEDED TABLET, CHEWABLE ORAL
Status: ACTIVE | COMMUNITY

## 2023-07-11 RX ORDER — MONTELUKAST SODIUM 10 MG/1
TAKE 1 TABLET DAILY AS DIRECTED TABLET, FILM COATED ORAL
Refills: 0 | Status: ACTIVE | COMMUNITY

## 2023-07-11 RX ORDER — ALBUTEROL SULFATE 90 UG/1
POWDER, METERED RESPIRATORY (INHALATION)
Qty: 1 | Refills: 0 | Status: ACTIVE | COMMUNITY
Start: 2018-07-09

## 2023-07-11 RX ORDER — ZOLPIDEM TARTRATE 10 MG/1
TAKE 1 TABLET AT BEDTIME AS NEEDED FOR SLEEP TABLET, FILM COATED ORAL
Qty: 15 | Refills: 0 | Status: ACTIVE | COMMUNITY

## 2023-07-11 RX ORDER — RABEPRAZOLE SODIUM 20 MG/1
1 TABLET TABLET, DELAYED RELEASE ORAL ONCE A DAY
Qty: 30 | OUTPATIENT
Start: 2023-07-11

## 2023-07-11 RX ORDER — EPINEPHRINE 0.3 MG/.3ML
INJECT INTRAMUSCULARLY AS DIRECTED AT FIRST SIGN OF ANAPHYLAXIS INJECTION INTRAMUSCULAR
Qty: 2 | Refills: 0 | Status: ACTIVE | COMMUNITY
Start: 2018-10-17

## 2023-07-11 RX ORDER — FAMOTIDINE 20 MG/1
TAKE ONE TABLET BY MOUTH AT BEDTIME TABLET, FILM COATED ORAL
Status: ACTIVE | COMMUNITY

## 2023-07-11 RX ORDER — PHENTERMINE HYDROCHLORIDE 37.5 MG/1
1 CAPSULE CAPSULE ORAL ONCE A DAY
Status: ACTIVE | COMMUNITY

## 2023-07-11 RX ORDER — ERENUMAB-AOOE 70 MG/ML
AS DIRECTED INJECTION SUBCUTANEOUS
Status: ACTIVE | COMMUNITY

## 2023-07-11 RX ORDER — HUMAN C1-ESTERASE INHIBITOR 500 [IU]/5ML
AS DIRECTED INJECTION, POWDER, LYOPHILIZED, FOR SOLUTION INTRAVENOUS
Refills: 0 | Status: ACTIVE | COMMUNITY
Start: 2018-02-01

## 2023-07-11 RX ORDER — GABAPENTIN 600 MG/1
1 TABLET TABLET, FILM COATED ORAL ONCE A DAY
Status: ON HOLD | COMMUNITY

## 2023-07-11 RX ORDER — ICATIBANT ACETATE 30 MG/3ML
INJECT 1 GM  PRN INJECTION, SOLUTION SUBCUTANEOUS
Refills: 0 | Status: ACTIVE | COMMUNITY

## 2023-07-11 NOTE — HPI-TODAY'S VISIT:
This is a 38-year-old woman with a history of GERD, esophageal dysphagia and adenomatous colon polyps, presenting for 3-month follow-up. She was seen in the office in February 2023 with complaints of GERD and esophageal dysphagia in setting of exhausting supply of PPI.  She had increased heartburn symptoms on famotidine alone.  An EGD with possible dilation was a consideration if there was not any improvement with resumption of PPI.  Regarding her history of liver adenoma that appeared to be involuting on MRI in October 2021, she was recommended a repeat MRI for surveillance.  She continues with heartburn complaints despite omeprazole 40 mg once daily and famotidine 40 mg at bedtime. She did not have any improvement with twice daily PPI, either. Her swallowing complaints seem worse with worsening heartburn. There is no nausea or vomiting. No weight loss. No melena or red blood per rectum. She has bowel movements daily. She did not ever hear about scheduling a MRI of her liver to reevaluate liver adenomas.

## 2023-08-01 ENCOUNTER — CLAIMS CREATED FROM THE CLAIM WINDOW (OUTPATIENT)
Dept: URBAN - METROPOLITAN AREA SURGERY CENTER 25 | Facility: SURGERY CENTER | Age: 38
End: 2023-08-01
Payer: COMMERCIAL

## 2023-08-01 ENCOUNTER — CLAIMS CREATED FROM THE CLAIM WINDOW (OUTPATIENT)
Dept: URBAN - METROPOLITAN AREA SURGERY CENTER 25 | Facility: SURGERY CENTER | Age: 38
End: 2023-08-01

## 2023-08-01 ENCOUNTER — CLAIMS CREATED FROM THE CLAIM WINDOW (OUTPATIENT)
Dept: URBAN - METROPOLITAN AREA CLINIC 4 | Facility: CLINIC | Age: 38
End: 2023-08-01
Payer: COMMERCIAL

## 2023-08-01 ENCOUNTER — TELEPHONE ENCOUNTER (OUTPATIENT)
Dept: URBAN - METROPOLITAN AREA CLINIC 113 | Facility: CLINIC | Age: 38
End: 2023-08-01

## 2023-08-01 DIAGNOSIS — K22.89 OTHER SPECIFIED DISEASE OF ESOPHAGUS: ICD-10-CM

## 2023-08-01 DIAGNOSIS — R13.19 ESOPHAGEAL DYSPHAGIA: ICD-10-CM

## 2023-08-01 DIAGNOSIS — K21.9 GASTROESOPHAGEAL REFLUX DISEASE, ESOPHAGITIS PRESENCE NOT SPECIFIED: ICD-10-CM

## 2023-08-01 PROCEDURE — 43248 EGD GUIDE WIRE INSERTION: CPT | Performed by: INTERNAL MEDICINE

## 2023-08-01 PROCEDURE — 43239 EGD BIOPSY SINGLE/MULTIPLE: CPT | Performed by: INTERNAL MEDICINE

## 2023-08-01 PROCEDURE — G8907 PT DOC NO EVENTS ON DISCHARG: HCPCS | Performed by: INTERNAL MEDICINE

## 2023-08-01 PROCEDURE — 00731 ANES UPR GI NDSC PX NOS: CPT | Performed by: NURSE ANESTHETIST, CERTIFIED REGISTERED

## 2023-08-01 PROCEDURE — 00731 ANES UPR GI NDSC PX NOS: CPT | Performed by: ANESTHESIOLOGY

## 2023-08-01 PROCEDURE — 88305 TISSUE EXAM BY PATHOLOGIST: CPT | Performed by: PATHOLOGY

## 2023-08-01 RX ORDER — OMEPRAZOLE 40 MG/1
1 CAPSULE 30 MINUTES BEFORE MORNING MEAL CAPSULE, DELAYED RELEASE ORAL ONCE A DAY
Qty: 90 | Refills: 3 | Status: ACTIVE | COMMUNITY

## 2023-08-01 RX ORDER — ALPRAZOLAM 0.5 MG
TAKE 1 TABLET 3 TIMES DAILY AS NEEDED TABLET ORAL
Refills: 0 | Status: ACTIVE | COMMUNITY

## 2023-08-01 RX ORDER — RABEPRAZOLE SODIUM 20 MG/1
1 TABLET TABLET, DELAYED RELEASE ORAL ONCE A DAY
Qty: 90 | Refills: 3
Start: 2023-07-11

## 2023-08-01 RX ORDER — ZOLPIDEM TARTRATE 10 MG/1
TAKE 1 TABLET AT BEDTIME AS NEEDED FOR SLEEP TABLET, FILM COATED ORAL
Qty: 15 | Refills: 0 | Status: ACTIVE | COMMUNITY

## 2023-08-01 RX ORDER — HUMAN C1-ESTERASE INHIBITOR 500 [IU]/5ML
AS DIRECTED INJECTION, POWDER, LYOPHILIZED, FOR SOLUTION INTRAVENOUS
Refills: 0 | Status: ACTIVE | COMMUNITY
Start: 2018-02-01

## 2023-08-01 RX ORDER — ICATIBANT ACETATE 30 MG/3ML
INJECT 1 GM  PRN INJECTION, SOLUTION SUBCUTANEOUS
Refills: 0 | Status: ACTIVE | COMMUNITY

## 2023-08-01 RX ORDER — OMEPRAZOLE 40 MG/1
1 CAPSULE CAPSULE, DELAYED RELEASE ORAL TWICE DAILY
Qty: 180 | Refills: 3 | Status: ON HOLD | COMMUNITY

## 2023-08-01 RX ORDER — MONTELUKAST SODIUM 10 MG/1
TAKE 1 TABLET DAILY AS DIRECTED TABLET, FILM COATED ORAL
Refills: 0 | Status: ACTIVE | COMMUNITY

## 2023-08-01 RX ORDER — GABAPENTIN 600 MG/1
1 TABLET TABLET, FILM COATED ORAL ONCE A DAY
Status: ON HOLD | COMMUNITY

## 2023-08-01 RX ORDER — EPINEPHRINE 0.3 MG/.3ML
INJECT INTRAMUSCULARLY AS DIRECTED AT FIRST SIGN OF ANAPHYLAXIS INJECTION INTRAMUSCULAR
Qty: 2 | Refills: 0 | Status: ACTIVE | COMMUNITY
Start: 2018-10-17

## 2023-08-01 RX ORDER — OLOPATADINE HYDROCHLORIDE 7 MG/ML
1 DROP INTO AFFECTED EYE SOLUTION OPHTHALMIC ONCE A DAY
Refills: 0 | Status: ACTIVE | COMMUNITY
Start: 2018-07-09

## 2023-08-01 RX ORDER — FLUTICASONE PROPIONATE 50 UG/1
USE 1 SPRAY IN EACH NOSTRIL TWICE DAILY SPRAY, METERED NASAL
Refills: 0 | Status: ACTIVE | COMMUNITY

## 2023-08-01 RX ORDER — RABEPRAZOLE SODIUM 20 MG/1
1 TABLET TABLET, DELAYED RELEASE ORAL ONCE A DAY
Qty: 30 | Status: ACTIVE | COMMUNITY
Start: 2023-07-11

## 2023-08-01 RX ORDER — OMEPRAZOLE 40 MG/1
1 CAPSULE CAPSULE, DELAYED RELEASE ORAL TWICE A DAY
Qty: 60 | Refills: 5 | Status: ON HOLD | COMMUNITY
Start: 2020-08-27

## 2023-08-01 RX ORDER — ERENUMAB-AOOE 70 MG/ML
AS DIRECTED INJECTION SUBCUTANEOUS
Status: ACTIVE | COMMUNITY

## 2023-08-01 RX ORDER — SUCRALFATE 1 G
1 TABLET ON AN EMPTY STOMACH TABLET ORAL TWICE A DAY
Status: ON HOLD | COMMUNITY

## 2023-08-01 RX ORDER — SIMETHICONE 80 MG/1
1 TABLET AFTER MEALS AND AT BEDTIME AS NEEDED TABLET, CHEWABLE ORAL
Status: ACTIVE | COMMUNITY

## 2023-08-01 RX ORDER — FAMOTIDINE 20 MG/1
TAKE ONE TABLET BY MOUTH AT BEDTIME TABLET, FILM COATED ORAL
Status: ACTIVE | COMMUNITY

## 2023-08-01 RX ORDER — ALBUTEROL SULFATE 90 UG/1
POWDER, METERED RESPIRATORY (INHALATION)
Qty: 1 | Refills: 0 | Status: ACTIVE | COMMUNITY
Start: 2018-07-09

## 2023-08-01 RX ORDER — ESCITALOPRAM 10 MG/1
TABLET, FILM COATED ORAL
Qty: 90 | Refills: 0 | Status: ACTIVE | COMMUNITY
Start: 2018-04-25

## 2023-08-01 RX ORDER — PHENTERMINE HYDROCHLORIDE 37.5 MG/1
1 CAPSULE CAPSULE ORAL ONCE A DAY
Status: ACTIVE | COMMUNITY

## 2023-09-12 ENCOUNTER — OFFICE VISIT (OUTPATIENT)
Dept: URBAN - METROPOLITAN AREA CLINIC 113 | Facility: CLINIC | Age: 38
End: 2023-09-12
Payer: COMMERCIAL

## 2023-09-12 ENCOUNTER — DASHBOARD ENCOUNTERS (OUTPATIENT)
Age: 38
End: 2023-09-12

## 2023-09-12 VITALS
BODY MASS INDEX: 31.43 KG/M2 | SYSTOLIC BLOOD PRESSURE: 100 MMHG | HEART RATE: 69 BPM | TEMPERATURE: 97.5 F | RESPIRATION RATE: 18 BRPM | WEIGHT: 170.8 LBS | DIASTOLIC BLOOD PRESSURE: 64 MMHG | HEIGHT: 62 IN

## 2023-09-12 DIAGNOSIS — K21.00 GASTROESOPHAGEAL REFLUX DISEASE WITH ESOPHAGITIS WITHOUT HEMORRHAGE: ICD-10-CM

## 2023-09-12 DIAGNOSIS — R13.19 ESOPHAGEAL DYSPHAGIA: ICD-10-CM

## 2023-09-12 DIAGNOSIS — D13.4 ADENOMA OF LIVER: ICD-10-CM

## 2023-09-12 PROCEDURE — 99213 OFFICE O/P EST LOW 20 MIN: CPT | Performed by: NURSE PRACTITIONER

## 2023-09-12 RX ORDER — SIMETHICONE 80 MG/1
1 TABLET AFTER MEALS AND AT BEDTIME AS NEEDED TABLET, CHEWABLE ORAL
Status: ON HOLD | COMMUNITY

## 2023-09-12 RX ORDER — ESCITALOPRAM 10 MG/1
TABLET, FILM COATED ORAL
Qty: 90 | Refills: 0 | Status: ACTIVE | COMMUNITY
Start: 2018-04-25

## 2023-09-12 RX ORDER — ALPRAZOLAM 0.5 MG
TAKE 1 TABLET 3 TIMES DAILY AS NEEDED TABLET ORAL
Refills: 0 | Status: ACTIVE | COMMUNITY

## 2023-09-12 RX ORDER — EPINEPHRINE 0.3 MG/.3ML
INJECT INTRAMUSCULARLY AS DIRECTED AT FIRST SIGN OF ANAPHYLAXIS INJECTION INTRAMUSCULAR
Qty: 2 | Refills: 0 | Status: ACTIVE | COMMUNITY
Start: 2018-10-17

## 2023-09-12 RX ORDER — PHENTERMINE HYDROCHLORIDE 37.5 MG/1
1 CAPSULE CAPSULE ORAL ONCE A DAY
Status: ACTIVE | COMMUNITY

## 2023-09-12 RX ORDER — OLOPATADINE HYDROCHLORIDE 7 MG/ML
1 DROP INTO AFFECTED EYE SOLUTION OPHTHALMIC ONCE A DAY
Refills: 0 | Status: ACTIVE | COMMUNITY
Start: 2018-07-09

## 2023-09-12 RX ORDER — SUCRALFATE 1 G
1 TABLET ON AN EMPTY STOMACH TABLET ORAL TWICE A DAY
Status: ON HOLD | COMMUNITY

## 2023-09-12 RX ORDER — OMEPRAZOLE 40 MG/1
1 CAPSULE CAPSULE, DELAYED RELEASE ORAL TWICE A DAY
Qty: 60 | Refills: 5 | Status: ON HOLD | COMMUNITY
Start: 2020-08-27

## 2023-09-12 RX ORDER — MONTELUKAST SODIUM 10 MG/1
TAKE 1 TABLET DAILY AS DIRECTED TABLET, FILM COATED ORAL
Refills: 0 | Status: ACTIVE | COMMUNITY

## 2023-09-12 RX ORDER — ZOLPIDEM TARTRATE 10 MG/1
TAKE 1 TABLET AT BEDTIME AS NEEDED FOR SLEEP TABLET, FILM COATED ORAL
Qty: 15 | Refills: 0 | Status: ACTIVE | COMMUNITY

## 2023-09-12 RX ORDER — RABEPRAZOLE SODIUM 20 MG/1
1 TABLET TABLET, DELAYED RELEASE ORAL ONCE A DAY
Qty: 90 TABLET | Refills: 3

## 2023-09-12 RX ORDER — ALBUTEROL SULFATE 90 UG/1
POWDER, METERED RESPIRATORY (INHALATION)
Qty: 1 | Refills: 0 | Status: ACTIVE | COMMUNITY
Start: 2018-07-09

## 2023-09-12 RX ORDER — ICATIBANT ACETATE 30 MG/3ML
INJECT 1 GM  PRN INJECTION, SOLUTION SUBCUTANEOUS
Refills: 0 | Status: ACTIVE | COMMUNITY

## 2023-09-12 RX ORDER — OMEPRAZOLE 40 MG/1
1 CAPSULE CAPSULE, DELAYED RELEASE ORAL TWICE DAILY
Qty: 180 | Refills: 3 | Status: ON HOLD | COMMUNITY

## 2023-09-12 RX ORDER — ERENUMAB-AOOE 70 MG/ML
AS DIRECTED INJECTION SUBCUTANEOUS
Status: ACTIVE | COMMUNITY

## 2023-09-12 RX ORDER — HUMAN C1-ESTERASE INHIBITOR 500 [IU]/5ML
AS DIRECTED INJECTION, POWDER, LYOPHILIZED, FOR SOLUTION INTRAVENOUS
Refills: 0 | Status: ACTIVE | COMMUNITY
Start: 2018-02-01

## 2023-09-12 RX ORDER — GABAPENTIN 600 MG/1
1 TABLET TABLET, FILM COATED ORAL ONCE A DAY
Status: ON HOLD | COMMUNITY

## 2023-09-12 RX ORDER — FLUTICASONE PROPIONATE 50 UG/1
USE 1 SPRAY IN EACH NOSTRIL TWICE DAILY SPRAY, METERED NASAL
Refills: 0 | Status: ACTIVE | COMMUNITY

## 2023-09-12 RX ORDER — RABEPRAZOLE SODIUM 20 MG/1
1 TABLET TABLET, DELAYED RELEASE ORAL ONCE A DAY
Qty: 90 | Refills: 3 | Status: ACTIVE | COMMUNITY
Start: 2023-07-11

## 2023-09-12 RX ORDER — OMEPRAZOLE 40 MG/1
1 CAPSULE 30 MINUTES BEFORE MORNING MEAL CAPSULE, DELAYED RELEASE ORAL ONCE A DAY
Qty: 90 | Refills: 3 | Status: ON HOLD | COMMUNITY

## 2023-09-12 RX ORDER — FAMOTIDINE 40 MG/1
1 TABLET AT BEDTIME TABLET, FILM COATED ORAL ONCE A DAY
Qty: 90 TABLET | Refills: 3 | OUTPATIENT
Start: 2023-09-12

## 2023-09-12 RX ORDER — FAMOTIDINE 20 MG/1
TAKE ONE TABLET BY MOUTH AT BEDTIME TABLET, FILM COATED ORAL
Status: ACTIVE | COMMUNITY

## 2023-09-12 NOTE — HPI-TODAY'S VISIT:
37 yo woman with GERD and esophageal dysphagia, presenting for follow up after an EGD, as well as follow up after MRI with [facility] for surveillance of liver adenoma.  Abdominal MRI with [facility] 8/2/23 showed complete involution of adenomas, and noted regions of residual scarring.  EGD 8/1/23: Regular Z line 38 cm from the incisors s/p dilation, normal esophageal mucosa s/p biopsies. Normal stomach and duodenum. Esophagus biopsies were negative for inflammation or allergic cells.  Rabeprazole was sent to her pharmacy after the EGD.  She continues with swallowing complaints characterized as a globus senstaion.  She does have seasonal allergies, and is taking nasal sprays, Singulair, etc. Changing her PPI from omeprazole to rabeprazole has not improving her symptoms. Symptoms are worse at night. She follows with Dr. Lana Ramirez with allergy. No changes to her allergy medications recently. She is taking Pepcid at bedtime. Her symptoms are overall tolerable.

## 2024-09-24 ENCOUNTER — OFFICE VISIT (OUTPATIENT)
Dept: URBAN - METROPOLITAN AREA CLINIC 113 | Facility: CLINIC | Age: 39
End: 2024-09-24
Payer: COMMERCIAL

## 2024-09-24 VITALS
TEMPERATURE: 98.1 F | BODY MASS INDEX: 34.74 KG/M2 | RESPIRATION RATE: 12 BRPM | HEIGHT: 62 IN | HEART RATE: 68 BPM | WEIGHT: 188.8 LBS | SYSTOLIC BLOOD PRESSURE: 129 MMHG | DIASTOLIC BLOOD PRESSURE: 86 MMHG

## 2024-09-24 DIAGNOSIS — K21.00 GASTROESOPHAGEAL REFLUX DISEASE WITH ESOPHAGITIS WITHOUT HEMORRHAGE: ICD-10-CM

## 2024-09-24 DIAGNOSIS — R13.19 ESOPHAGEAL DYSPHAGIA: ICD-10-CM

## 2024-09-24 PROCEDURE — 99214 OFFICE O/P EST MOD 30 MIN: CPT | Performed by: NURSE PRACTITIONER

## 2024-09-24 RX ORDER — EPINEPHRINE 0.3 MG/.3ML
INJECT INTRAMUSCULARLY AS DIRECTED AT FIRST SIGN OF ANAPHYLAXIS INJECTION INTRAMUSCULAR
Qty: 2 | Refills: 0 | Status: ACTIVE | COMMUNITY
Start: 2018-10-17

## 2024-09-24 RX ORDER — FLUTICASONE PROPIONATE 50 UG/1
USE 1 SPRAY IN EACH NOSTRIL TWICE DAILY SPRAY, METERED NASAL
Refills: 0 | Status: ACTIVE | COMMUNITY

## 2024-09-24 RX ORDER — ERENUMAB-AOOE 70 MG/ML
AS DIRECTED INJECTION SUBCUTANEOUS
Status: ON HOLD | COMMUNITY

## 2024-09-24 RX ORDER — FAMOTIDINE 40 MG/1
1 TABLET AT BEDTIME TABLET, FILM COATED ORAL ONCE A DAY
Qty: 90 TABLET | Refills: 3 | Status: ACTIVE | COMMUNITY
Start: 2023-09-12

## 2024-09-24 RX ORDER — ESCITALOPRAM 10 MG/1
TABLET, FILM COATED ORAL
Qty: 90 | Refills: 0 | Status: ACTIVE | COMMUNITY
Start: 2018-04-25

## 2024-09-24 RX ORDER — ZOLPIDEM TARTRATE 10 MG/1
TAKE 1 TABLET AT BEDTIME AS NEEDED FOR SLEEP TABLET, FILM COATED ORAL
Qty: 15 | Refills: 0 | Status: ACTIVE | COMMUNITY

## 2024-09-24 RX ORDER — HUMAN C1-ESTERASE INHIBITOR 500 [IU]/5ML
AS DIRECTED INJECTION, POWDER, LYOPHILIZED, FOR SOLUTION INTRAVENOUS
Refills: 0 | Status: ACTIVE | COMMUNITY
Start: 2018-02-01

## 2024-09-24 RX ORDER — ALPRAZOLAM 0.5 MG
TAKE 1 TABLET 3 TIMES DAILY AS NEEDED TABLET ORAL
Refills: 0 | Status: ACTIVE | COMMUNITY

## 2024-09-24 RX ORDER — OMEPRAZOLE 40 MG/1
1 CAPSULE CAPSULE, DELAYED RELEASE ORAL TWICE A DAY
Qty: 60 | Refills: 5 | Status: ON HOLD | COMMUNITY
Start: 2020-08-27

## 2024-09-24 RX ORDER — FAMOTIDINE 20 MG/1
TAKE ONE TABLET BY MOUTH AT BEDTIME TABLET, FILM COATED ORAL
Status: ACTIVE | COMMUNITY

## 2024-09-24 RX ORDER — ICATIBANT ACETATE 30 MG/3ML
INJECT 1 GM  PRN INJECTION, SOLUTION SUBCUTANEOUS
Refills: 0 | Status: ACTIVE | COMMUNITY

## 2024-09-24 RX ORDER — MONTELUKAST SODIUM 10 MG/1
TAKE 1 TABLET DAILY AS DIRECTED TABLET, FILM COATED ORAL
Refills: 0 | Status: ACTIVE | COMMUNITY

## 2024-09-24 RX ORDER — OMEPRAZOLE 40 MG/1
1 CAPSULE 30 MINUTES BEFORE MORNING MEAL CAPSULE, DELAYED RELEASE ORAL ONCE A DAY
Qty: 90 | Refills: 3 | Status: ON HOLD | COMMUNITY

## 2024-09-24 RX ORDER — PHENTERMINE HYDROCHLORIDE 37.5 MG/1
1 CAPSULE CAPSULE ORAL ONCE A DAY
Status: ON HOLD | COMMUNITY

## 2024-09-24 RX ORDER — GABAPENTIN 600 MG/1
1 TABLET TABLET, FILM COATED ORAL ONCE A DAY
Status: ON HOLD | COMMUNITY

## 2024-09-24 RX ORDER — RABEPRAZOLE SODIUM 20 MG/1
1 TABLET TABLET, DELAYED RELEASE ORAL ONCE A DAY
Qty: 90 TABLET | Refills: 3 | Status: ACTIVE | COMMUNITY

## 2024-09-24 RX ORDER — VONOPRAZAN FUMARATE 13.36 MG/1
1 TABLET TABLET ORAL ONCE A DAY
Qty: 30 | OUTPATIENT
Start: 2024-09-24 | End: 2024-10-24

## 2024-09-24 RX ORDER — ALBUTEROL SULFATE 90 UG/1
POWDER, METERED RESPIRATORY (INHALATION)
Qty: 1 | Refills: 0 | Status: ACTIVE | COMMUNITY
Start: 2018-07-09

## 2024-09-24 RX ORDER — OLOPATADINE HYDROCHLORIDE 7 MG/ML
1 DROP INTO AFFECTED EYE SOLUTION OPHTHALMIC ONCE A DAY
Refills: 0 | Status: ACTIVE | COMMUNITY
Start: 2018-07-09

## 2024-09-24 RX ORDER — OMEPRAZOLE 40 MG/1
1 CAPSULE CAPSULE, DELAYED RELEASE ORAL TWICE DAILY
Qty: 180 | Refills: 3 | Status: ON HOLD | COMMUNITY

## 2024-09-24 RX ORDER — SUCRALFATE 1 G/1
1 TABLET ON AN EMPTY STOMACH TABLET ORAL TWICE A DAY
Status: ON HOLD | COMMUNITY

## 2024-09-24 RX ORDER — SIMETHICONE 80 MG/1
1 TABLET AFTER MEALS AND AT BEDTIME AS NEEDED TABLET, CHEWABLE ORAL
Status: ON HOLD | COMMUNITY

## 2024-09-24 NOTE — HPI-TODAY'S VISIT:
38 yo woman presenting for annual follow up. She was seen in the office in September 2023 for esophageal dyspaghia, GERD and liver adenoma. She was doing okay on rabeprazole 20 mg daily, and famotidine 40 mg daily. Her MRI with [facility] in 2023 showed complete involution of liver adenomas.  She continues with heartburn despite rabeprazole. She did try to go without the rabeprazole secondary to a change in insurance a few months ago. She resumed her rabeprazole. She is otherwise asymptomatic from a GI standpoint.